# Patient Record
Sex: FEMALE | Race: WHITE | Employment: UNEMPLOYED | URBAN - METROPOLITAN AREA
[De-identification: names, ages, dates, MRNs, and addresses within clinical notes are randomized per-mention and may not be internally consistent; named-entity substitution may affect disease eponyms.]

---

## 2017-04-19 ENCOUNTER — HOSPITAL ENCOUNTER (OUTPATIENT)
Dept: PHYSICAL THERAPY | Age: 82
Discharge: HOME | End: 2017-04-19
Payer: MEDICARE

## 2017-04-19 PROCEDURE — 97163 PT EVAL HIGH COMPLEX 45 MIN: CPT

## 2017-04-19 PROCEDURE — G8978 MOBILITY CURRENT STATUS: HCPCS

## 2017-04-19 PROCEDURE — 97530 THERAPEUTIC ACTIVITIES: CPT

## 2017-04-19 PROCEDURE — G8979 MOBILITY GOAL STATUS: HCPCS

## 2017-04-19 NOTE — PROGRESS NOTES
[address]  [facility] PHYSICAL THERAPY  [address] [facility] [address], [address] - Phone: [phone]  Fax: [phone] / [address] [place] [address]  Patient Name: Ida Noel : 1933   Medical   Diagnosis: Low back pain [M54.5] Treatment Diagnosis: LBP w/T11 compression fx   Onset Date: chronic     Referral Source: Sandralee Sacks, NP Start of Care [facility]): 2017   Prior Hospitalization: See medical history Provider #: [id number]   Prior Level of Function: retired and sedentary; (I) with mobility and ADLs before 2017. Does not drive   Comorbidities: osteopenia, allergies, OA, compression fx, LBP, BMI>30, obstructive pulmonary diesease, CHF, A-Fib, previous hx of DVT and PE, DM, HTN,  Sleep dysfunction, visual and hearing impaired, defibrillator   Medications: Verified on Patient Summary List   The Plan of Care and following information is based on the information from the initial evaluation.   ===========================================================================================  Assessment / hernandez information:  Ida Noel is a 80 y.o.  female with Dx: Low back pain [M54.5], signs and symptoms consistent with chronic LBP and generalized weakness which is exacerbated by a T11 compression fracture verified by CT scan 2017. Patient also reports that she is unable to tolerate the supine position due to fear of inability to breathe. Patient now presents with gait abnormalities, poor posture, decreased LE strength, decreased transfer, ambulation, and ADL ability, and decreased ability to negotiate steps or obstacles.  Objective:    L/S ROM  Range    Effect    Strength (MMT)      Right  Left   Flex 75% NE Psoas (L2,3) 2 2   Ext 10% pain Quads (L3) 4+ 4-   R-lat flex     Ant tib(L4)       L-lat flex     Hip Add 4 4   R-rot     Glut Med(L5) 3+ 3+   L-rot     Hamstrings(S1,S2) 3 3     all measured in sitting   Hip IR/ER            FOTO score 19 points indicating 81% limitation in functional ability. Patient would benefit from skilled PT to address the below listed impairments. Thank you for your referral.  ===========================================================================================  Eval Complexity: History: HIGH Complexity :3+ comorbidities / personal factors will impact the outcome/ POC Exam:HIGH Complexity : 4+ Standardized tests and measures addressing body structure, function, activity limitation and / or participation in recreation  Presentation: HIGH Complexity : Unstable and unpredictable characteristics  Clinical Decision Making:HIGH Complexity : FOTO score of 1- 25 Overall Complexity:HIGH   Problem List: pain affecting function, decrease ROM, decrease strength, impaired gait/ balance, decrease ADL/ functional abilitiies, decrease activity tolerance, decrease flexibility/ joint mobility and decrease transfer abilities   Treatment Plan may include any combination of the following: Therapeutic exercise, Therapeutic activities, Neuromuscular re-education, Physical agent/modality, Gait/balance training, Manual therapy, Patient education, Self Care training, Functional mobility training, Home safety training and Stair training  Patient / Family readiness to learn indicated by: asking questions, trying to perform skills and interest  Persons(s) to be included in education: patient (P)  Barriers to Learning/Limitations: yes;  sensory deficits-vision/hearing/speech  Measures taken: na   Patient Goal (s): \"walk around house without walker, climb stairs, sleep in partial recline\"   Patient self reported health status: fair  Rehabilitation Potential: fair   Short Term Goals: To be accomplished in  2-3  weeks:  1. Patient will demonstrate compliance with HEP for symptom management at home.   2. Patient will demonstrate ability to tolerate Hilton's position for 10 minutes without any complications in order to perform mat therex. 3. Patient will demonstrate thoracolumbar ext of 50% in order to improve posture with gait and minimize LBP.  Long Term Goals: To be accomplished in  4-6  weeks:  1. Patient will be independent with HEP to self-manage and prevent symptoms upon DC. 2.  Patient will improve FOTO score to 38 points to indicate improved functional status. 3.  Patient will improve B hip abd strength to 4/5 in order to improve stability with ambulatory activities. 4.  Patient will improve hip flx strength to 3/5 in order to increase safety with transfers. Frequency / Duration:   Patient to be seen  2  times per week for 4-6  weeks:  Patient / Caregiver education and instruction: self care, activity modification and exercises  G-Codes (GP): Mobility [id number] Current  CM= 80-99%   Goal  CL= 60-79%. The severity rating is based on the FOTO Score    Therapist Signature: Alek Rizvi PT Date: 0/37/4283   Certification Period: 4/19/17 - 7/18/17 Time: 3:04 PM   ===========================================================================================  I certify that the above Physical Therapy Services are being furnished while the patient is under my care. I agree with the treatment plan and certify that this therapy is necessary. Physician Signature:        Date:       Time:     Please sign and return to [employer] or you may fax the signed copy to [phone]. Thank you.

## 2017-04-19 NOTE — PROGRESS NOTES
PHYSICAL THERAPY - DAILY TREATMENT NOTE    Patient Name: Meagan Greenwood        Date: 2017  : 1933   YES Patient  Verified  Visit #:   1     Insurance: Payor: Dilcia Castillo / Plan: [place] MED[place] PART [place] & B / Product Type: Medicare /      In time: 3:06 Out time: 4:55   Total Treatment Time: 52     Medicare Time Tracking (below)   Total Timed Codes (min):  49 1:1 Treatment Time:  49     TREATMENT AREA =  LBP  SUBJECTIVE    Pain Level (on 0 to 10 scale):  0  / 10   Medication Changes/New allergies or changes in medical history, any new surgeries or procedures? YES    If yes, update Summary List   Subjective Functional Status/Changes:  []  No changes reported     History of Condition: Pt is a 80 y.o. female who presents this day with a c/c of LBP and generalized weakness. Pt reports this began a long time ago and reports she had a compression fx of T11 vertebrae in -2017 verified by CT scan, but was not given a back brace. While pt was hospitalized, she was also treated for CHF. Pt admits she has osteopenia and pt denies hx of falls. Pt reports she has been unable to lay in supine for the past year due to a CHF exacerbation which made it difficult for her to breathe. Aggravating Factors: transfering, thoracolumbar ext    Alleviating Factors:  no activity, being still and careful with movement, meds    Previous Treatment:  denies previous PT    PMHx:see chart CHF, LE edema    Social/Recreational/Work: retired and sedentary; (I) with mobility and ADLs before 2017.  Does not drive    Pt Goals: \"walk around house without walker, climb stairs, sleep in partial recline\"     FOTO:19     LOW BACK EVALUATION    Objective:      Gait: ambulates w/ rollator, forward trunk, decreased leonel    Posture: kyphosis, forward head posture, enlarged R thoracic kyphosis    Palpation/Sensation: no TTP noted along paraspinals    (N - normal; R - reduced; MR - markedly reduced)       L/S ROM      Range Effect  Strength (MMT)          Right        Left    Flex 75% NE Psoas (L2,3) 2 2   Ext 10%  pain Quads (L3) 4+ 4-   R-lat flex   Ant tib(L4)     L-lat flex   Hip Add 4 4   R-rot   Glut Med(L5) 3+ 3+   L-rot   Hamstrings(S1,S2) 3 3    AROM measured in sitting  Hip IR/ER       23 min Therapeutic Activity: FOTO   Rationale:   Determine functional mobility     min Patient Education:  []  Review HEP   []  Progressed/Changed HEP based on:   HEP to be initiated NV     Other Objective/Functional Measures:    See above information    Patient refused therex this visit     Post Treatment Pain Level (on 0 to 10) scale:   0  / 10     ASSESSMENT    Assessment/Changes in Function:     Justification for Eval Code Complexity:  Patient History (low 0, mod 1-2, high 3-4): osteopenia, allergies, OA, compression fx, LBP, BMI>30, obstructive pulmonary diesease, CHF, A-Fib, previous hx of DVT and PE, DM, HTN,  Sleep dysfunction, visual and hearing impaired, defibrillator  Examination (low 1-2, mod 3+, high 4+):   Clinical Presentation (low stable or uncomplicated, mod evolving or changing, high unstable or unpredictable):   Clinical Decision Making (low , mod 26-74, high 1-25): FOTO 19    See PoC     []  See Progress Note/Recertification   Patient will continue to benefit from skilled PT services to modify and progress therapeutic interventions, address functional mobility deficits, address ROM deficits, address strength deficits, analyze and address soft tissue restrictions, analyze and cue movement patterns, analyze and modify body mechanics/ergonomics, assess and modify postural abnormalities, address imbalance/dizziness and instruct in home and community integration to attain remaining goals.    Progress toward goals / Updated goals:    Goals established, see PoC     PLAN    [x]  Upgrade activities as tolerated YES Continue plan of care   []  Discharge due to :    [x]  Other: Initiate PoC     Therapist: Alexx Newton, PT Date: 4/19/2017 Time: 3:05 PM

## 2017-04-24 ENCOUNTER — HOSPITAL ENCOUNTER (OUTPATIENT)
Dept: PHYSICAL THERAPY | Age: 82
Discharge: HOME | End: 2017-04-24
Payer: MEDICARE

## 2017-04-24 PROCEDURE — 97530 THERAPEUTIC ACTIVITIES: CPT

## 2017-04-24 PROCEDURE — 97110 THERAPEUTIC EXERCISES: CPT

## 2017-04-24 NOTE — PROGRESS NOTES
PHYSICAL THERAPY - DAILY TREATMENT NOTE    Patient Name: Robby Wynn        Date: 2017  : 1933   YES Patient  Verified  Visit #:   2     Insurance: Payor: Jelani Oneil / Plan: [place] MEDICARE PART A & B / Product Type: Medicare /      In time: 10:25 Out time: 11:03   Total Treatment Time: 38     Medicare Time Tracking (below)   Total Timed Codes (min):  38 1:1 Treatment Time:  38     TREATMENT AREA =  Low back pain [M54.5]    SUBJECTIVE    Pain Level (on 0 to 10 scale):  0  / 10   Medication Changes/New allergies or changes in medical history, any new surgeries or procedures? NO     If yes, update Summary List   Subjective Functional Status/Changes:  []  No changes reported     \"I'm not used to being awake this time of day. And I like to be behind the curtain if I can. I don't want everyone watching me. \"          OBJECTIVE    30 min Therapeutic Exercise:  [x]  See flow sheet   Rationale:      increase ROM and increase strength to improve the patients ability to perform ADL's with improved core stability. 8 min Therapeutic Activity: Sit<>supine transfers, gait training 2 x 30 ft   Rationale: To increase safety and efficiency with ADL's.     min Patient Education:  YES  Reviewed HEP   []  Progressed/Changed HEP based on:   Patient reports compliance     Other Objective/Functional Measures:    Pt did tolerate lying on a wedge, was fearful of lying supine. Pt was able to performed bridges in a shortened range, initiated other LE strengthening exercises. See flowsheet for more details. Post Treatment Pain Level (on 0 to 10) scale:   1  / 10     ASSESSMENT    Assessment/Changes in Function:     Pt has a poor activity tolerance and is very fearful of hurting herself. Pt requires a lot of encouragement to ensure she will not injure her back when performing seated and supine hip stabilization exercises.      []  See Progress Note/Recertification   Patient will continue to benefit from skilled PT services to modify and progress therapeutic interventions, address functional mobility deficits, address strength deficits, analyze and address soft tissue restrictions, analyze and cue movement patterns, analyze and modify body mechanics/ergonomics and assess and modify postural abnormalities to attain remaining goals.    Progress toward goals / Updated goals:    First follow-up since eval.     PLAN    [x]  Upgrade activities as tolerated YES Continue plan of care   []  Discharge due to :    []  Other:      Therapist: Sanya Puga    Date: 4/24/2017 Time: 10:31 AM     Future Appointments  Date Time Provider Kavita Real   4/28/2017 9:30 AM Daniela DILL [facility]   5/3/2017 1:00 PM Umesh Arauz [facility]   5/5/2017 10:00 AM Orly Valdez [facility]   5/8/2017 1:00 PM Orly Valdez [facility]   5/12/2017 1:30 PM Juan José [facility]   5/17/2017 1:00 PM Umesh Arauz PT [facility]   5/19/2017 10:00 AM Orly Valdez [facility]   [facility]   5/26/2017 10:00 AM Orly Valdez [facility]   5/31/2017 1:00 PM Umesh Arauz PT [facility]

## 2017-04-28 ENCOUNTER — HOSPITAL ENCOUNTER (OUTPATIENT)
Dept: PHYSICAL THERAPY | Age: 82
Discharge: HOME | End: 2017-04-28
Payer: MEDICARE

## 2017-04-28 PROCEDURE — 97110 THERAPEUTIC EXERCISES: CPT

## 2017-04-28 NOTE — PROGRESS NOTES
PHYSICAL THERAPY - DAILY TREATMENT NOTE      Patient Name: Lena Alexander        Date: 2017  : 1933   YES Patient  Verified  Visit #:   3   of     Insurance: Payor: Gumaro Anastasia / Plan: [place] MEDICARE PART A & B / Product Type: Medicare /      In time: 9:30 Out time: 10:00   Total Treatment Time: 30 min     Medicare Time Tracking (below)   Total Timed Codes (min):  30 1:1 Treatment Time:  30     TREATMENT AREA =  Low back pain [M54.5]    SUBJECTIVE    Pain Level (on 0 to 10 scale):  0  / 10   Medication Changes/New allergies or changes in medical history, any new surgeries or procedures? NO    If yes, update Summary List   Subjective Functional Status/Changes:  []  No changes reported     \"I need an easy day. \"        OBJECTIVE    25 (25) min Therapeutic Exercise:  [x]  See flow sheet   Rationale:      increase ROM, increase strength, improve coordination, improve balance and increase proprioception to improve the patients ability to ambulate with improved LE stability     5 (5) min Therapeutic Activity: (S) with stair negoatiation   Rationale:   increase strength, improve coordination, improve balance and increase proprioception to improve the patients home entry/exit with improved safety. 1 min Patient Education:  YES  Reviewed HEP   []  Progressed/Changed HEP based on: Other Objective/Functional Measures:    Pt with c/o LBP with standing hip abduction; pain abolished immediately upon seated rest break  Added alternating tap ups on 4\" box inside parallel bars with bilateral HHA    Negotiated 3 steps x 2 trials with (S) and use of bilateral handrails     Post Treatment Pain Level (on 0 to 10) scale:   0  / 10     ASSESSMENT    Assessment/Changes in Function:     Fair tolerance for therex today with alternating between standing/seated exercises. Demo safe step to stair negotiation with bilateral HHA.  Will continue to progress to unilateral HHA as this is what pt has to reach bedroom. []  See Progress Note/Recertification   Patient will continue to benefit from skilled PT services to modify and progress therapeutic interventions, address functional mobility deficits, address ROM deficits, address strength deficits, analyze and address soft tissue restrictions, analyze and cue movement patterns, analyze and modify body mechanics/ergonomics, assess and modify postural abnormalities, address imbalance/dizziness and instruct in home and community integration to attain remaining goals. Progress toward goals / Updated goals: · Short Term Goals: To be accomplished in 2-3 weeks:  1. Patient will demonstrate compliance with HEP for symptom management at home. 2. Patient will demonstrate ability to tolerate Hilton's position for 10 minutes without any complications in order to perform mat therex. 3. Patient will demonstrate thoracolumbar ext of 50% in order to improve posture with gait and minimize LBP.        PLAN    [x]  Upgrade activities as tolerated YES Continue plan of care   []  Discharge due to :    []  Other:      Therapist: Vj Velasco DPT    Date: 4/28/2017 Time: 8:25 AM     Future Appointments  Date Time Provider Kavita Real   4/28/2017 9:30 AM Brittany DILL [facility]   5/3/2017 1:00 PM Steve Moran [facility]   5/5/2017 10:00 AM Rebeka Garcia [facility]   5/8/2017 1:00 PM Rebeka Garcia [facility]   5/12/2017 1:30 PM Raman DILL [facility]   5/17/2017 1:00 PM Steve Moran [facility]   5/19/2017 10:00 AM Rebeka Garcia [facility]   5/24/2017 1:00 PM Nataly Barrera [facility]   5/26/2017 10:00 AM Rebeka Garcia [facility]   5/31/2017 1:00 PM Steve Moran [facility]

## 2017-05-03 ENCOUNTER — HOSPITAL ENCOUNTER (OUTPATIENT)
Dept: PHYSICAL THERAPY | Age: 82
Discharge: HOME | End: 2017-05-03
Payer: MEDICARE

## 2017-05-03 PROCEDURE — 97110 THERAPEUTIC EXERCISES: CPT

## 2017-05-03 NOTE — PROGRESS NOTES
PHYSICAL THERAPY - DAILY TREATMENT NOTE    Patient Name: Hong Mast        Date: 5/3/2017  : 1933   YES Patient  Verified  Visit #:   4     Insurance: Payor: Wen Sachin / Plan: [place] MEDICARE PART A & B / Product Type: Medicare /      In time: 1:00 Out time: 1:30   Total Treatment Time: 30     Medicare Time Tracking (below)   Total Timed Codes (min):  30 1:1 Treatment Time:  30     TREATMENT AREA =  Low back pain [M54.5]    SUBJECTIVE    Pain Level (on 0 to 10 scale):  1-2  / 10   Medication Changes/New allergies or changes in medical history, any new surgeries or procedures? NO     If yes, update Summary List   Subjective Functional Status/Changes:  []  No changes reported     \"I think I overdid it some yesterday, I am having a little pain and I'm not used to any. I really liked going up the stairs last time. I want to only be here for 30 minutes. \"          OBJECTIVE    30 min Therapeutic Exercise:  [x]  See flow sheet   Rationale:      increase ROM, increase strength, improve coordination, improve balance and increase proprioception to improve the patients ability to perform ADL's and amb with decreased pain and improved ease      min Patient Education:  YES  Reviewed HEP   []  Progressed/Changed HEP based on:   Patient reports compliance     Other Objective/Functional Measures:    Patient required numerous rest breaks this session, reports fatigue  Able to progress reps of stairs, continues to require supervision and B HR'     Post Treatment Pain Level (on 0 to 10) scale:   1  / 10     ASSESSMENT    Assessment/Changes in Function:     Patient with limited progress this session due to fatigue     []  See Progress Note/Recertification   Patient will continue to benefit from skilled PT services to modify and progress therapeutic interventions, address functional mobility deficits, address ROM deficits, address strength deficits, analyze and address soft tissue restrictions, analyze and cue movement patterns, analyze and modify body mechanics/ergonomics and assess and modify postural abnormalities to attain remaining goals. Progress toward goals / Updated goals: · Short Term Goals: To be accomplished in 2-3 weeks:  1. Patient will demonstrate compliance with HEP for symptom management at home. 2. Patient will demonstrate ability to tolerate Hilton's position for 10 minutes without any complications in order to perform mat therex. 3. Patient will demonstrate thoracolumbar ext of 50% in order to improve posture with gait and minimize LBP. · Long Term Goals: To be accomplished in 4-6 weeks:  1. Patient will be independent with HEP to self-manage and prevent symptoms upon DC. 2. Patient will improve FOTO score to 38 points to indicate improved functional status. 3. Patient will improve B hip abd strength to 4/5 in order to improve stability with ambulatory activities. 4. Patient will improve hip flx strength to 3/5 in order to increase safety with transfers.      PLAN    [x]  Upgrade activities as tolerated YES Continue plan of care   []  Discharge due to :    []  Other:      Therapist: Alek Rizvi PT    Date: 5/3/2017 Time: 1:00 PM     Future Appointments  Date Time Provider Kavita Real   5/5/2017 10:00 AM Chyna Noriega [facility]   5/8/2017 1:00 PM Chyna Noriega [facility]   [facility]   5/17/2017 1:00 PM Alek Rizvi PT [facility]   5/19/2017 10:00 AM Chyna Noriega [facility]   5/24/2017 1:00 PM Andie [facility]   5/26/2017 10:00 AM Chyna Noriega [facility]   5/31/2017 1:00 PM Alek Rizvi PT [facility]

## 2017-05-05 ENCOUNTER — HOSPITAL ENCOUNTER (OUTPATIENT)
Dept: PHYSICAL THERAPY | Age: 82
Discharge: HOME | End: 2017-05-05
Payer: MEDICARE

## 2017-05-05 PROCEDURE — 97110 THERAPEUTIC EXERCISES: CPT

## 2017-05-05 PROCEDURE — 97530 THERAPEUTIC ACTIVITIES: CPT

## 2017-05-05 NOTE — PROGRESS NOTES
PHYSICAL THERAPY - DAILY TREATMENT NOTE    Patient Name: Hossein Huffman        Date: 2017  : 1933   yes Patient  Verified  Visit #:     Insurance: Payor: Prabhumyravania Lundberg / Plan: [place] MEDICARE PART A & B / Product Type: Medicare /      In time: 1000 Out time: 3496   Total Treatment Time: 35     Medicare Time Tracking (below)   Total Timed Codes (min):  35 1:1 Treatment Time:  35     TREATMENT AREA =  Low back pain [M54.5]    SUBJECTIVE  Pain Level (on 0 to 10 scale):  1  / 10   Medication Changes/New allergies or changes in medical history, any new surgeries or procedures?    no  If yes, update Summary List   Subjective Functional Status/Changes:  []  No changes reported     \"I am           OBJECTIVE      25 min Therapeutic Exercise:  [x]  See flow sheet   Rationale:      increase ROM and increase strength to improve the patients ability to   perform ADLs/prolong stding and amb/stairs with ease     10 min Therapeutic Activity: step ups  negotiated up/down 3 steps x 4 with B HRs step to pattern> reciprocal pattern   tap ups   Rationale:    increase ROM and increase strength to improve the patients ability to  perform ADLs/prolong stding and amb/stairs with ease          min Patient Education:  yes  Reviewed HEP   []  Progressed/Changed HEP based on:  Pt ed on importance and benefits of compliance with HEP, core strength/stability and proper posture; pt verbalized understanding    See updated HEP in chart       Other Objective/Functional Measures:    Initiated TE step ups on 4\", and std hip abd  VCs + demo to perform proper technique for TE  c/o pain with most TE, able to perform all TE without increasing sxs   demos poor posture with gait, improved with VCs to decrease trunk flex     Post Treatment Pain Level (on 0 to 10) scale:   1  / 10     ASSESSMENT  Assessment/Changes in Function:     Progressed there-ex without c/o increase p!  improved posture with gait post rx     []  See Progress Note/Recertification   Patient will continue to benefit from skilled PT services to modify and progress therapeutic interventions, address functional mobility deficits, address ROM deficits, address strength deficits, analyze and address soft tissue restrictions, analyze and cue movement patterns, analyze and modify body mechanics/ergonomics, assess and modify postural abnormalities and instruct in home and community integration to attain remaining goals. Progress toward goals / Updated goals: · Short Term Goals: To be accomplished in 2-3 weeks:  1. Patient will demonstrate compliance with HEP for symptom management at home. MET  2. Patient will demonstrate ability to tolerate Hilton's position for 10 minutes without any complications in order to perform mat therex. 3. Patient will demonstrate thoracolumbar ext of 50% in order to improve posture with gait and minimize LBP. progressing, demos upright posture with VCs  · Long Term Goals: To be accomplished in 4-6 weeks:  1. Patient will be independent with HEP to self-manage and prevent symptoms upon DC. 2. Patient will improve FOTO score to 38 points to indicate improved functional status. 3. Patient will improve B hip abd strength to 4/5 in order to improve stability with ambulatory activities. 4. Patient will improve hip flx strength to 3/5 in order to increase safety with transfers.      PLAN  []  Upgrade activities as tolerated yes Continue plan of care   []  Discharge due to :    []  Other:      Therapist: Rebeka Garcia PTA    Date: 5/5/2017 Time: 10:35 AM     Future Appointments  Date Time Provider Kavita Real   5/8/2017 1:00 PM Rebeka Garcia PTA [facility]   5/12/2017 1:30 PM Nataly Caba [facility]   5/17/2017 1:00 PM Steve Moran PT [facility]   5/19/2017 10:00 AM Rebeka Garcia [facility]   5/24/2017 1:00 PM [address] [facility]   5/26/2017 10:00 AM Rebeka Garcia PTA [facility]   5/31/2017 1:00 PM Steve Moran PT [facility]

## 2017-05-08 ENCOUNTER — HOSPITAL ENCOUNTER (OUTPATIENT)
Dept: PHYSICAL THERAPY | Age: 82
Discharge: HOME | End: 2017-05-08
Payer: MEDICARE

## 2017-05-08 PROCEDURE — 97110 THERAPEUTIC EXERCISES: CPT

## 2017-05-08 PROCEDURE — 97530 THERAPEUTIC ACTIVITIES: CPT

## 2017-05-08 NOTE — PROGRESS NOTES
PHYSICAL THERAPY - DAILY TREATMENT NOTE    Patient Name: Brenna Parker        Date: 2017  : 1933   yes Patient  Verified  Visit #:     Insurance: Payor: Devika Auguste / Plan: [place] MEDICARE PART A & B / Product Type: Medicare /      In time: 100 Out time: 135   Total Treatment Time: 35     Medicare Time Tracking (below)   Total Timed Codes (min):  35 1:1 Treatment Time:  35     TREATMENT AREA =  Low back pain [M54.5]    SUBJECTIVE  Pain Level (on 0 to 10 scale): 3  / 10   Medication Changes/New allergies or changes in medical history, any new surgeries or procedures?    no  If yes, update Summary List   Subjective Functional Status/Changes:  []  No changes reported     \"I am trying to work on my posture when I walk\"         OBJECTIVE      25 min Therapeutic Exercise:  [x]  See flow sheet   Rationale:      increase ROM and increase strength to improve the patients ability to   perform ADLs/prolong stding and amb/stairs with ease     10 min Therapeutic Activity: step ups  negotiated up/down 3 steps x 4 with B HRs with reciprocal pattern   tap ups   Rationale:    increase ROM and increase strength to improve the patients ability to  perform ADLs/prolong stding and amb/stairs with ease          min Patient Education:  yes  Reviewed HEP   []  Progressed/Changed HEP based on:  Pt ed on importance and benefits of compliance with HEP, core strength/stability and proper posture; pt verbalized understanding     Other Objective/Functional Measures:  demos improved trunk ext with gait upon entering clinic    Increased to 1.5 # with LAQs without c/o     VCs + demo to perform proper technique for TE       Post Treatment Pain Level (on 0 to 10) scale:   1  / 10     ASSESSMENT  Assessment/Changes in Function:     Progressed there-ex without c/o increase p!  demos improved trunk ext with gait upon entering clinic     []  See Progress Note/Recertification   Patient will continue to benefit from skilled PT services to modify and progress therapeutic interventions, address functional mobility deficits, address ROM deficits, address strength deficits, analyze and address soft tissue restrictions, analyze and cue movement patterns, analyze and modify body mechanics/ergonomics, assess and modify postural abnormalities and instruct in home and community integration to attain remaining goals. Progress toward goals / Updated goals: · Short Term Goals: To be accomplished in 2-3 weeks:  1. Patient will demonstrate compliance with HEP for symptom management at home. MET  2. Patient will demonstrate ability to tolerate Hilton's position for 10 minutes without any complications in order to perform mat therex. 3. Patient will demonstrate thoracolumbar ext of 50% in order to improve posture with gait and minimize LBP. progressing, demos upright posture with VCs  · Long Term Goals: To be accomplished in 4-6 weeks:  1. Patient will be independent with HEP to self-manage and prevent symptoms upon DC. 2. Patient will improve FOTO score to 38 points to indicate improved functional status. 3. Patient will improve B hip abd strength to 4/5 in order to improve stability with ambulatory activities. 4. Patient will improve hip flx strength to 3/5 in order to increase safety with transfers.      PLAN  []  Upgrade activities as tolerated yes Continue plan of care   []  Discharge due to :    []  Other:      Therapist: Geri Zaragoza PTA    Date: 5/8/2017 Time: 10:35 AM     Future Appointments  Date Time Provider Kavita Real   5/12/2017 1:30 PM Kezia [facility]   5/17/2017 1:00 PM Maday Garcia PT [facility]   5/19/2017 10:00 AM Geri Zaragoza PTA [facility]   5/24/2017 1:00 PM [address] [facility]   5/26/2017 10:00 AM Geri Zaragoza PTA [facility]   5/31/2017 1:00 PM Maday Garcia PT [facility]

## 2017-05-12 ENCOUNTER — HOSPITAL ENCOUNTER (OUTPATIENT)
Dept: PHYSICAL THERAPY | Age: 82
Discharge: HOME | End: 2017-05-12
Payer: MEDICARE

## 2017-05-12 PROCEDURE — 97110 THERAPEUTIC EXERCISES: CPT

## 2017-05-12 NOTE — PROGRESS NOTES
PHYSICAL THERAPY - DAILY TREATMENT NOTE      Patient Name: Tricia Woodruff        Date: 2017  : 1933   YES Patient  Verified  Visit #:   7   of     Insurance: Payor: Flor Sy / Plan: [place] MEDICARE PART A & B / Product Type: Medicare /      In time: 1:33 Out time: 2:00   Total Treatment Time: 27 min     Medicare Time Tracking (below)   Total Timed Codes (min):  27 1:1 Treatment Time:  27     TREATMENT AREA =  Low back pain [M54.5]    SUBJECTIVE    Pain Level (on 0 to 10 scale):  0  / 10   Medication Changes/New allergies or changes in medical history, any new surgeries or procedures? NO    If yes, update Summary List   Subjective Functional Status/Changes:  []  No changes reported     \"I went to the pulmonologist today; everything's OK. \"        OBJECTIVE    27 (27) min Therapeutic Exercise:  [x]  See flow sheet   Rationale:      increase ROM, increase strength, improve coordination, improve balance and increase proprioception to improve the patients ability to improved standing/ambulation tolerance. 1 min Patient Education:  YES  Reviewed HEP   []  Progressed/Changed HEP based on: Added scap retractions and T/S extension in chair. Other Objective/Functional Measures:    Inc forward step ups/tap ups to 6\" box today with RUE support at staircase; patient required 1 standing rest break during each exercise    HR 72, SpO2 96 following tap up/step ups    POC performed seated in rollator with focus on thoracic and lumbar repeated extension. Inc resistance of seated therex today without pain   Post Treatment Pain Level (on 0 to 10) scale:   0  / 10     ASSESSMENT    Assessment/Changes in Function:     Patient with decreasing forward flexed posture during gait. Continues to demo muscular and cardiovascular fatigue during therex however SpO2 and HR in normal range. Will continue to emphasize regular spinal extension as part of HEP.      []  See Progress Note/Recertification   Patient will continue to benefit from skilled PT services to modify and progress therapeutic interventions, address functional mobility deficits, address ROM deficits, address strength deficits, analyze and address soft tissue restrictions, analyze and cue movement patterns, analyze and modify body mechanics/ergonomics, assess and modify postural abnormalities, address imbalance/dizziness and instruct in home and community integration to attain remaining goals. Progress toward goals / Updated goals: · Short Term Goals: To be accomplished in 2-3 weeks:  1. Patient will demonstrate compliance with HEP for symptom management at home. 2. Patient will demonstrate ability to tolerate Hilton's position for 10 minutes without any complications in order to perform mat therex. 3. Patient will demonstrate thoracolumbar ext of 50% in order to improve posture with gait and minimize LBP.  POC w/ TA, T/S ext in chair       PLAN    [x]  Upgrade activities as tolerated YES Continue plan of care   []  Discharge due to :    []  Other:      Therapist: Dana Ortiz DPT    Date: 5/12/2017 Time: 11:09 AM     Future Appointments  Date Time Provider Kavita Real   5/12/2017 1:30 PM Carson DILL [facility]   5/17/2017 1:00 PM Sergei Batista PT [facility]   5/19/2017 10:00 AM Dwaine Galindo [facility]   5/24/2017 1:00 PM Orly Pinedo [facility]   5/26/2017 10:00 AM Dwaien Galindo [facility]   5/31/2017 1:00 PM Sergei Batista PT [facility]

## 2017-05-17 ENCOUNTER — HOSPITAL ENCOUNTER (OUTPATIENT)
Dept: PHYSICAL THERAPY | Age: 82
Discharge: HOME | End: 2017-05-17
Payer: MEDICARE

## 2017-05-17 PROCEDURE — 97110 THERAPEUTIC EXERCISES: CPT

## 2017-05-17 NOTE — PROGRESS NOTES
PHYSICAL THERAPY - DAILY TREATMENT NOTE    Patient Name: Geena Butterfield        Date: 2017  : 1933   YES Patient  Verified  Visit #:     Insurance: Payor: Hi Garner / Plan: [place] MEDICARE PART A & B / Product Type: Medicare /      In time: 1:00 Out time: 1:27   Total Treatment Time: 27     Medicare Time Tracking (below)   Total Timed Codes (min):  27 1:1 Treatment Time:  27     TREATMENT AREA =  Low back pain [M54.5]    SUBJECTIVE    Pain Level (on 0 to 10 scale):  0  / 10   Medication Changes/New allergies or changes in medical history, any new surgeries or procedures? NO     If yes, update Summary List   Subjective Functional Status/Changes:  []  No changes reported     \"I don't have any pain. My legs stay sore, it isn't going away. The weakness isn't getting better. \"          OBJECTIVE    27 min Therapeutic Exercise:  [x]  See flow sheet   Rationale:      increase ROM, increase strength, improve coordination, improve balance and increase proprioception to improve the patients ability to perform ADLs' and amb with decreased pain and improved ease      min Patient Education:  YES  Reviewed HEP   []  Progressed/Changed HEP based on:   Patient reports compliance     Other Objective/Functional Measures:     Mod cues for recollection of ex's this visit, despite reporting HEP compliance  Advised patient to rest to alleviate LE soreness, however patient refused  Numerous rest breaks required this visit, alternated between seated and standing therex  Patient did not perform full requested reps this visit  Extremely fatigued after 3x3 steps stair negotiation     Post Treatment Pain Level (on 0 to 10) scale:   0  / 10     ASSESSMENT    Assessment/Changes in Function:     Patient continues with extreme fatigue after sessions limiting progress     []  See Progress Note/Recertification   Patient will continue to benefit from skilled PT services to modify and progress therapeutic interventions, address functional mobility deficits, address ROM deficits, address strength deficits, analyze and address soft tissue restrictions, analyze and cue movement patterns, analyze and modify body mechanics/ergonomics and assess and modify postural abnormalities to attain remaining goals. Progress toward goals / Updated goals: · Short Term Goals: To be accomplished in 2-3 weeks:  1. Patient will demonstrate compliance with HEP for symptom management at home. MET  2. Patient will demonstrate ability to tolerate Hilton's position for 10 minutes without any complications in order to perform mat therex. 3. Patient will demonstrate thoracolumbar ext of 50% in order to improve posture with gait and minimize LBP. progressing, demos upright posture with VCs; POC with TA, T/S ext in chair  · Long Term Goals: To be accomplished in 4-6 weeks:  1. Patient will be independent with HEP to self-manage and prevent symptoms upon DC. 2. Patient will improve FOTO score to 38 points to indicate improved functional status. 3. Patient will improve B hip abd strength to 4/5 in order to improve stability with ambulatory activities. 4. Patient will improve hip flx strength to 3/5 in order to increase safety with transfers.     PN NV     PLAN    [x]  Upgrade activities as tolerated YES Continue plan of care   []  Discharge due to :    []  Other:      Therapist: Tai Bowen PT    Date: 5/17/2017 Time: 12:59 PM     Future Appointments  Date Time Provider aKvita Real   5/17/2017 1:00 PM Tai Bowen PT [facility]   5/19/2017 10:00 AM Hari Jimenez [facility]   5/24/2017 1:00 PM [address] [place] [address]   5/26/2017 10:00 AM Hari iJmenez PTA [facility]   5/31/2017 1:00 PM Tai Bowen PT [facility]

## 2017-05-19 ENCOUNTER — HOSPITAL ENCOUNTER (OUTPATIENT)
Dept: PHYSICAL THERAPY | Age: 82
Discharge: HOME | End: 2017-05-19
Payer: MEDICARE

## 2017-05-19 PROCEDURE — 97110 THERAPEUTIC EXERCISES: CPT

## 2017-05-19 PROCEDURE — G8979 MOBILITY GOAL STATUS: HCPCS

## 2017-05-19 PROCEDURE — 97530 THERAPEUTIC ACTIVITIES: CPT

## 2017-05-19 PROCEDURE — G8980 MOBILITY D/C STATUS: HCPCS

## 2017-05-19 NOTE — PROGRESS NOTES
PHYSICAL THERAPY - DAILY TREATMENT NOTE    Patient Name: Adolph Lopez        Date: 2017  : 1933   yes Patient  Verified  Visit #:     Insurance: Payor: Jannetta Dancer / Plan: [place] MEDICARE PART A & B / Product Type: Medicare /      In time: 1000 Out time:    Total Treatment Time: 35     Medicare Time Tracking (below)   Total Timed Codes (min): 35 1:1 Treatment Time:  35     TREATMENT AREA =  Low back pain [M54.5]    SUBJECTIVE  Pain Level (on 0 to 10 scale): 1 / 10   Medication Changes/New allergies or changes in medical history, any new surgeries or procedures?    no  If yes, update Summary List   Subjective Functional Status/Changes:  []  No changes reported     \"I still have the pain.  I am moving around better overall'       OBJECTIVE      25 min Therapeutic Exercise:  [x]  See flow sheet   Rationale:      increase ROM and increase strength to improve the patients ability to   perform ADLs/prolong stding and amb/stairs with ease     10 min Therapeutic Activity: step ups  negotiated up/down 3 steps x 5 with B HRs with reciprocal pattern   tap ups  Foto assessment   Rationale:    increase ROM and increase strength to improve the patients ability to  perform ADLs/prolong stding and amb/stairs with ease          min Patient Education:  yes  Reviewed HEP   []  Progressed/Changed HEP based on:  Pt ed on importance and benefits of compliance with HEP, core strength/stability and proper posture; pt verbalized understanding     Other Objective/Functional Measures:    functional status summery=55 (IE=19/goal=38)    hip strength flex=L=4/5, R=4/5; ABD:L=5/5, R=5/5*performed seated   VCs + demo to perform proper technique for TE  demos fwd trunk flex with stair negotiation; improved with VCs     Post Treatment Pain Level (on 0 to 10) scale:   0  / 10     ASSESSMENT  Assessment/Changes in Function:     Progressed there-ex without c/o increase p!  demos improved trunk ext with gait upon entering clinic     []  See Progress Note/Recertification   Patient will continue to benefit from skilled PT services to modify and progress therapeutic interventions, address functional mobility deficits, address ROM deficits, address strength deficits, analyze and address soft tissue restrictions, analyze and cue movement patterns, analyze and modify body mechanics/ergonomics, assess and modify postural abnormalities and instruct in home and community integration to attain remaining goals. Progress toward goals / Updated goals: Mobility   Goal  CK= 40-59%  D/C  CK= 40-59%. The severity rating is based on the FOTO Score    · Long Term Goals: To be accomplished in 4-6 weeks:  1. Patient will be independent with HEP to self-manage and prevent symptoms upon DC. 2. Patient will improve FOTO score to 38 points to indicate improved functional status. 3. Patient will improve B hip abd strength to 4/5 in order to improve stability with ambulatory activities. MET=5/5 *performed seated  4. Patient will improve hip flx strength to 3/5 in order to increase safety with transfers.  MET=4/5     PLAN  []  Upgrade activities as tolerated yes Continue plan of care   []  Discharge due to :    []  Other:      Therapist: Chyna Noriega PTA    Date: 5/19/2017 Time: 10:03 AM     Future Appointments  Date Time Provider Kavita Real   [facility]   5/26/2017 10:00 AM Chyna Noriega PTA [facility]   5/31/2017 1:00 PM Alek Rizvi PT [facility]

## 2017-05-19 NOTE — PROGRESS NOTES
[address] [facility] PHYSICAL THERAPY  [address], [address] - Phone: [phone]  Fax: [phone]          Patient Name: Yashira Bruce : 1933   Treatment/Medical Diagnosis: Low back pain [M54.5]   Onset Date: chronic    Referral Source: Rose Mary Quiroz NP Start of Care [facility]): 2017   Prior Hospitalization: See Medical History Provider #: [id number]   Prior Level of Function: retired and sedentary; (I) with mobility and ADLs before 2017. Does not drive   Comorbidities: osteopenia, allergies, OA, compression fx, LBP, BMI>30, obstructive pulmonary diesease, CHF, A-Fib, previous hx of DVT and PE, DM, HTN, Sleep dysfunction, visual and hearing impaired, defibrillator   Medications: Verified on Patient Summary List   Visits from [facility]: 9 Missed Visits: 0     Goal/Measure of Progress Goal Met? 1. Patient will demonstrate ability to tolerate Hilton's position for 10 minutes without any complications in order to perform mat therex. Status at last Eval: Unable to tolerate Current Status: NT no   2. Patient will demonstrate thoracolumbar ext of 50% in order to improve posture with gait and minimize LBP. Status at last Eval: 10% Current Status: Requires cueing for upright posture no   3. Patient will improve FOTO score to 38 points to indicate improved functional status. Status at last Eval: 19 Current Status: 55 yes   4. Patient will improve B hip abd strength to 4/5 in order to improve stability with ambulatory activities. Status at last Eval: 3+/5 B in sitting Current Status: 5/5 in sitting yes     5. Patient will improve hip flex strength to 3/5 in order to increase safety with transfers.    Status at last Eval: 2/5 B Current Status: 4/5 in sitting yes     Therapy has consisted of dynamic warmup, followed by therex to improve LE strength, core stability, and activity tolerance, and stair negotiation to improve safety with home and community mobility. Key Functional Changes/Progress: Patient has progressed fairly well with therapy, demonstrating improved LE strength in sitting hip strength flex=L=4/5, R=4/5; ABD:L=5/5, R=5/5. Patient continues with poor, forward flexed posture, and requires ongoing cueing for proper form with therex. Patient continues to be challenged with stair negotiation as well. FOTO score improved to 55 indicating 45% limitation in functional ability. Problem List: pain affecting function, decrease ROM, decrease strength, edema affecting function, impaired gait/ balance, decrease ADL/ functional abilitiies, decrease activity tolerance, decrease flexibility/ joint mobility and decrease transfer abilities   Treatment Plan may include any combination of the following: Therapeutic exercise, Therapeutic activities, Neuromuscular re-education, Physical agent/modality, Gait/balance training, Manual therapy, Aquatic therapy, Patient education, Self Care training, Functional mobility training, Home safety training and Stair training  Patient Goal(s) has been updated and includes:      Goals for this certification period include and are to be achieved in   4  weeks:  1. Patient will demonstrate ability to tolerate Hilton's position for 10 minutes without any complications in order to perform mat therex. 2.   Patient will demonstrate thoracolumbar ext of 50% in order to improve posture with gait and minimize LBP. 3.   Patient will perform stair negotiation 5x3 steps without rest break in order to negotiate to second floor. Frequency / Duration:   Patient to be seen   2   times per week for   2-4    weeks:  G-Codes (GP): Mobility  [id number] Goal  CK= 40-59%  D/C  CK= 40-59%.   The severity rating is based on the FOTO Score    Assessments/Recommendations: Patient would benefit from ongoing PT to further improve strength, activity tolerance, and safety with home and community mobility  If you have any questions/comments please contact us directly at [phone]. Thank you for allowing us to assist in the care of your patient. Therapist Signature: Shakira Aleman PT Date: 5/39/5145   Certification Period:  Reporting Period: 4/19/17-7/18/17 4/19/17-5/19/17 Time: 2:49 PM   NOTE TO PHYSICIAN:  PLEASE COMPLETE THE ORDERS BELOW AND FAX TO   [facility]: [phone]. If you are unable to process this request in 24 hours please contact our office: [phone].    ___ I have read the above report and request that my patient continue as recommended.   ___ I have read the above report and request that my patient continue therapy with the following changes/special instructions: ________________________________________________   ___ I have read the above report and request that my patient be discharged from therapy.      Physician Signature:        Date:       Time:

## 2017-05-24 ENCOUNTER — HOSPITAL ENCOUNTER (OUTPATIENT)
Dept: PHYSICAL THERAPY | Age: 82
Discharge: HOME | End: 2017-05-24
Payer: MEDICARE

## 2017-05-24 PROCEDURE — G8979 MOBILITY GOAL STATUS: HCPCS

## 2017-05-24 PROCEDURE — 97530 THERAPEUTIC ACTIVITIES: CPT

## 2017-05-24 PROCEDURE — G8978 MOBILITY CURRENT STATUS: HCPCS

## 2017-05-24 PROCEDURE — 97110 THERAPEUTIC EXERCISES: CPT

## 2017-05-24 NOTE — PROGRESS NOTES
PHYSICAL THERAPY - DAILY TREATMENT NOTE      Patient Name: Daquan Marvin        Date: 2017  : 1933   YES Patient  Verified  Visit #:   10   of   12-18  Insurance: Payor: Jesus Peacock / Plan: [place] MEDICARE PART A & B / Product Type: Medicare /      In time: 1:00 Out time: 1:40   Total Treatment Time: 40 min     Medicare Time Tracking (below)   Total Timed Codes (min):  40 1:1 Treatment Time:  40     TREATMENT AREA =  Low back pain [M54.5]    SUBJECTIVE    Pain Level (on 0 to 10 scale):  0  / 10   Medication Changes/New allergies or changes in medical history, any new surgeries or procedures? NO    If yes, update Summary List   Subjective Functional Status/Changes:  []  No changes reported     \"I'm not hurting as much. \"        OBJECTIVE    28 (28) min Therapeutic Exercise:  [x]  See flow sheet   Rationale:      increase ROM, increase strength, improve coordination, improve balance and increase proprioception to improve the patients ability to perform functional transfers with incresaed ease and efficiency     10 (10) min Therapeutic Activity: Stair negotiation 3 steps x 4 (twice) B HHA   Rationale:   increase ROM, increase strength, improve coordination, improve balance and increase proprioception to improve the patients ability to negotiate      1 min Patient Education:  YES  Reviewed HEP   []  Progressed/Changed HEP based on: Other Objective/Functional Measures:    Stair negotiation  3 steps x 4 trials. Pt performed twice (S) with Bilat HHA  Pt ambulated into clinic today with improved erect posture and T/S extension     Post Treatment Pain Level (on 0 to 10) scale:   0  / 10     ASSESSMENT    Assessment/Changes in Function:     Patient will continue to benefit from stair training, bed mobility (supine <> sit), transfer safety and postural re-education.      []  See Progress Note/Recertification   Patient will continue to benefit from skilled PT services to modify and progress therapeutic interventions, address functional mobility deficits, address ROM deficits, address strength deficits, analyze and address soft tissue restrictions, analyze and cue movement patterns, analyze and modify body mechanics/ergonomics, assess and modify postural abnormalities, address imbalance/dizziness and instruct in home and community integration to attain remaining goals. Progress toward goals / Updated goals:    1. Patient will demonstrate ability to tolerate Hilton's position for 10 minutes without any complications in order to perform mat therex. 2. Patient will demonstrate thoracolumbar ext of 50% in order to improve posture with gait and minimize LBP. T/S ext with scap retraction today  3. Patient will perform stair negotiation 5x3 steps without rest break in order to negotiate to second floor.  3 steps x 4 (2 trials)       PLAN    [x]  Upgrade activities as tolerated YES Continue plan of care   []  Discharge due to :    []  Other:      Therapist: Keary Favre, DPT    Date: 5/24/2017 Time: 1:26 PM     Future Appointments  Date Time Provider Kavita Real   5/26/2017 10:00 AM Lan Batres PTA [facility]   5/31/2017 1:00 PM Nate Davis PT [facility]

## 2017-05-26 ENCOUNTER — HOSPITAL ENCOUNTER (OUTPATIENT)
Dept: PHYSICAL THERAPY | Age: 82
Discharge: HOME | End: 2017-05-26
Payer: MEDICARE

## 2017-05-26 PROCEDURE — 97530 THERAPEUTIC ACTIVITIES: CPT

## 2017-05-26 PROCEDURE — 97110 THERAPEUTIC EXERCISES: CPT

## 2017-05-26 NOTE — PROGRESS NOTES
PHYSICAL THERAPY - DAILY TREATMENT NOTE    Patient Name: Hossein Huffman        Date: 2017  : 1933   yes Patient  Verified  Visit #:     Insurance: Payor: Prabhumyravania Chamber / Plan: [place] & B / Product Type: Medicare /      In time: 1000 Out time: 530   Total Treatment Time: 35     Medicare Time Tracking (below)   Total Timed Codes (min): 35 1:1 Treatment Time:  35     TREATMENT AREA =  Low back pain [M54.5]    SUBJECTIVE  Pain Level (on 0 to 10 scale): 0 / 10   Medication Changes/New allergies or changes in medical history, any new surgeries or procedures?    no  If yes, update Summary List   Subjective Functional Status/Changes:  []  No changes reported     \"I am so tired today. I do not know why.  I am not going to lie down today sorry; not today\"       OBJECTIVE      12 min Therapeutic Exercise:  [x]  See flow sheet   Rationale:      increase ROM and increase strength to improve the patients ability to   perform ADLs/prolong stding and amb/stairs with ease     23 min Therapeutic Activity: step ups  negotiated up/down 3 steps x 5x1, 4x 1 with B HRs with step to pattern   tap ups  sit<>std transfers   Rationale:    increase ROM and increase strength to improve the patients ability to  perform ADLs/prolong stding and amb/stairs with ease          min Patient Education:  yes  Reviewed HEP   []  Progressed/Changed HEP based on:  Pt ed on importance and benefits of compliance with HEP, core strength/stability and proper posture; pt verbalized understanding     Other Objective/Functional Measures:    VCs + demo to perform proper technique for TE  requested rest after 5 reps and declined TE after 35 mins   PD supine/semi-reclined position after max encouragement  demos decrease posture with stair training; improved with VCs    able to perform RTB rows/ext in standing; previously required YTB for ext and seated rest     Post Treatment Pain Level (on 0 to 10) scale:   0  / 10 ASSESSMENT  Assessment/Changes in Function:     Progressed there-ex without c/o increase p!  limited by pt's fatigue today; pr declined TE after 35 mins  also PD supine/semi relcined      []  See Progress Note/Recertification   Patient will continue to benefit from skilled PT services to modify and progress therapeutic interventions, address functional mobility deficits, address ROM deficits, address strength deficits, analyze and address soft tissue restrictions, analyze and cue movement patterns, analyze and modify body mechanics/ergonomics, assess and modify postural abnormalities and instruct in home and community integration to attain remaining goals. Progress toward goals / Updated goals:    · Long Term Goals: To be accomplished inc2-4 weeks:  · Goals for this certification period include and are to be achieved in  4  weeks:  1. Patient will demonstrate ability to tolerate Hilton's position for 10 minutes without any complications in order to perform mat therex. NOT MET, PD  2. Patient will demonstrate thoracolumbar ext of 50% in order to improve posture with gait and minimize LBP. 3. Patient will perform stair negotiation 5x3 steps without rest break in order to negotiate to second floor.  progressing, negotiated 5 x 2     PLAN  []  Upgrade activities as tolerated yes Continue plan of care   []  Discharge due to :    []  Other:      Therapist: Andreas Pink PTA    Date: 5/26/2017 Time: 11:23 AM     Future Appointments  Date Time Provider Kavita Real   5/26/2017 10:00 AM Andreas Pink PTA [facility]   5/31/2017 1:00 PM Raimundo Cordero PT [facility]   6/12/2017 1:00 PM Andreas Pink PTA [facility]   6/16/2017 1:00 PM [facility]   6/19/2017 1:00 PM [facility]   6/23/2017 1:00 PM [facility]   6/26/2017 1:30 PM Andreas Pink PTA [facility]   6/28/2017 1:00 PM Andreas Pink PTA [facility]

## 2017-05-31 ENCOUNTER — HOSPITAL ENCOUNTER (OUTPATIENT)
Dept: PHYSICAL THERAPY | Age: 82
Discharge: HOME | End: 2017-05-31
Payer: MEDICARE

## 2017-05-31 PROCEDURE — 97110 THERAPEUTIC EXERCISES: CPT

## 2017-05-31 NOTE — PROGRESS NOTES
PHYSICAL THERAPY - DAILY TREATMENT NOTE    Patient Name: Ida Noel        Date: 2017  : 1933   YES Patient  Verified  Visit #:     Insurance: Payor: Karla Camel / Plan: [place] MEDICARE PART A & B / Product Type: Medicare /      In time: 1:00 Out time: 1:30   Total Treatment Time: 30     Medicare Time Tracking (below)   Total Timed Codes (min):  30 1:1 Treatment Time:  30     TREATMENT AREA =  Low back pain [M54.5]    SUBJECTIVE    Pain Level (on 0 to 10 scale):  0  / 10   Medication Changes/New allergies or changes in medical history, any new surgeries or procedures? NO     If yes, update Summary List   Subjective Functional Status/Changes:  []  No changes reported     \"I just don't want to do this. My back isn't getting any better, but I need to talk to my doctor first. I don't go back for 2 weeks. \"          OBJECTIVE    30 min Therapeutic Exercise:  [x]  See flow sheet   Rationale:      increase ROM, increase strength, improve coordination, improve balance and increase proprioception to improve the patients ability to perform ADL's and amb with decreased pain and improved ease      min Patient Education:  YES  Reviewed HEP   []  Progressed/Changed HEP based on:   Patient reports compliance     Other Objective/Functional Measures:    Patient refused bed mobility training today despite education on need to practice  Educated patient on DOMS and process to strengthen muscles  Patient declined several ex's this visit     Post Treatment Pain Level (on 0 to 10) scale:   0  / 10     ASSESSMENT    Assessment/Changes in Function:     Patient is making limited progress towards goals, declined bed mobility and stair training this visit     []  See Progress Note/Recertification   Patient will continue to benefit from skilled PT services to modify and progress therapeutic interventions, address functional mobility deficits, address ROM deficits, address strength deficits, analyze and address soft tissue restrictions, analyze and cue movement patterns, analyze and modify body mechanics/ergonomics and assess and modify postural abnormalities to attain remaining goals. Progress toward goals / Updated goals:    · Goals for this certification period include and are to be achieved in  4  weeks:  1. Patient will demonstrate ability to tolerate Hilton's position for 10 minutes without any complications in order to perform mat therex. Patient refused  2. Patient will demonstrate thoracolumbar ext of 50% in order to improve posture with gait and minimize LBP. 3. Patient will perform stair negotiation 5x3 steps without rest break in order to negotiate to second floor.      PLAN    [x]  Upgrade activities as tolerated YES Continue plan of care   []  Discharge due to :    []  Other:      Therapist: Tai Bowen PT    Date: 5/31/2017 Time: 1:05 PM     Future Appointments  Date Time Provider Kavita Real   6/12/2017 1:00 PM [facility]   6/16/2017 1:00 PM [facility]   6/19/2017 1:00 PM [facility]   6/23/2017 1:00 PM [facility]   6/26/2017 1:30 PM [facility]   6/28/2017 1:00 PM Hari Jimenez [facility]

## 2017-06-12 ENCOUNTER — HOSPITAL ENCOUNTER (OUTPATIENT)
Dept: PHYSICAL THERAPY | Age: 82
Discharge: HOME | End: 2017-06-12
Payer: MEDICARE

## 2017-06-12 PROCEDURE — 97110 THERAPEUTIC EXERCISES: CPT

## 2017-06-12 PROCEDURE — 97530 THERAPEUTIC ACTIVITIES: CPT

## 2017-06-12 NOTE — PROGRESS NOTES
PHYSICAL THERAPY - DAILY TREATMENT NOTE    Patient Name: Paula Klein        Date: 2017  : 1933   yes Patient  Verified  Visit #:   15  of   13-17  Insurance: Payor: Julio Ee / Plan: [place] MEDICARE PART A & B / Product Type: Medicare /      In time: 100 Out time: 145   Total Treatment Time: 45     Medicare Time Tracking (below)   Total Timed Codes (min):  45 1:1 Treatment Time:  45     TREATMENT AREA =  Low back pain [M54.5]    SUBJECTIVE  Pain Level (on 0 to 10 scale): 0 / 10   Medication Changes/New allergies or changes in medical history, any new surgeries or procedures?    no  If yes, update Summary List   Subjective Functional Status/Changes:  []  No changes reported     \"I drove here for the first time. it wasn't bad.  I am really tired today\"       OBJECTIVE      22 min Therapeutic Exercise:  [x]  See flow sheet   Rationale:      increase ROM and increase strength to improve the patients ability to   perform ADLs/prolong stding and amb/stairs with ease     23 min Therapeutic Activity: sup<>sit   sit<>std transfers  (I) HK amb x 30' x 3 with rollator  lying in fowlers position   Rationale:    increase ROM and increase strength to improve the patients ability to  perform ADLs/prolong lying, stding and amb/stairs with ease          min Patient Education:  yes  Reviewed HEP   []  Progressed/Changed HEP based on:  Pt ed on importance and benefits of compliance with HEP, core strength/stability and proper posture; pt verbalized understanding     Other Objective/Functional Measures:    VCs + demo to perform proper technique for TE  initiated  TE in fowlers position for 10 mins without c/o p!; initially c/o p! with sup>sit; subsided after 30 secs of sitting    (I) HK amb x 30' x 3 with rollator to promote increase hip/knee flex during swing phase and to increase HS for proper gait     Post Treatment Pain Level (on 0 to 10) scale:   0  / 10     ASSESSMENT  Assessment/Changes in Function: Progressed there-ex without c/o increase p!  pt able to perform TE in fowlers position for 10 mins without c/o p! []  See Progress Note/Recertification   Patient will continue to benefit from skilled PT services to modify and progress therapeutic interventions, address functional mobility deficits, address ROM deficits, address strength deficits, analyze and address soft tissue restrictions, analyze and cue movement patterns, analyze and modify body mechanics/ergonomics, assess and modify postural abnormalities and instruct in home and community integration to attain remaining goals. Progress toward goals / Updated goals:    · Long Term Goals: To be accomplished inc2-4 weeks:  · Goals for this certification period include and are to be achieved in  4  weeks:  1. Patient will demonstrate ability to tolerate Hilton's position for 10 minutes without any complications in order to perform mat therex. MET  2. Patient will demonstrate thoracolumbar ext of 50% in order to improve posture with gait and minimize LBP. 3. Patient will perform stair negotiation 5x3 steps without rest break in order to negotiate to second floor.  progressing, negotiated 5 x 2     PLAN  []  Upgrade activities as tolerated yes Continue plan of care   []  Discharge due to :    []  Other:      Therapist: Taryn Sommers PTA    Date: 6/12/2017 Time: 3:31 PM     Future Appointments  Date Time Provider Kavita Real   6/16/2017 1:00 PM [facility]   6/19/2017 1:00 PM [facility]   6/23/2017 1:00 PM [facility]   6/26/2017 1:30 PM [facility]   6/28/2017 1:00 PM Taryn Sommers PTA [facility]

## 2017-06-16 ENCOUNTER — HOSPITAL ENCOUNTER (OUTPATIENT)
Dept: PHYSICAL THERAPY | Age: 82
Discharge: HOME | End: 2017-06-16
Payer: MEDICARE

## 2017-06-16 PROCEDURE — 97530 THERAPEUTIC ACTIVITIES: CPT

## 2017-06-16 PROCEDURE — 97110 THERAPEUTIC EXERCISES: CPT

## 2017-06-16 NOTE — PROGRESS NOTES
PHYSICAL THERAPY - DAILY TREATMENT NOTE    Patient Name: Marcell Tracy        Date: 2017  : 1933   yes Patient  Verified  Visit #:   15  of   17  Insurance: Payor: Ziyad Flores / Plan: [place] MEDICARE PART A & B / Product Type: Medicare /      In time: 103 Out time: 145   Total Treatment Time: 42     Medicare Time Tracking (below)   Total Timed Codes (min):  42 1:1 Treatment Time:  35     TREATMENT AREA =  Low back pain [M54.5]    SUBJECTIVE  Pain Level (on 0 to 10 scale): 0 / 10   Medication Changes/New allergies or changes in medical history, any new surgeries or procedures?    no  If yes, update Summary List   Subjective Functional Status/Changes:  []  No changes reported     \"I  was hurting more from lying down\"       OBJECTIVE      19 min Therapeutic Exercise:  [x]  See flow sheet   Rationale:      increase ROM and increase strength to improve the patients ability to   perform ADLs/prolong stding and amb/stairs with ease     23/16 min Therapeutic Activity: sup<>sit   sit<>std transfers  (I) HK amb x 60'  with rollator  lying in fowlers position  tap ups on 7\"   Rationale:    increase ROM and increase strength to improve the patients ability to  perform ADLs/prolong lying, stding and amb/stairs with ease          min Patient Education:  yes  Reviewed HEP   []  Progressed/Changed HEP based on:  Pt ed on importance and benefits of compliance with HEP, core strength/stability and proper posture; pt verbalized understanding     Other Objective/Functional Measures:    VCs + demo to perrm proper technique for TE  Pt c/o tired throughout rx  increased to 7\" tap ups, and negotiated up and down steps x 5 x 2 without c/o increase p! Post Treatment Pain Level (on 0 to 10) scale:   0  / 10     ASSESSMENT  Assessment/Changes in Function:     Progressed there-ex without c/o increase p!        []  See Progress Note/Recertification   Patient will continue to benefit from skilled PT services to modify and progress therapeutic interventions, address functional mobility deficits, address ROM deficits, address strength deficits, analyze and address soft tissue restrictions, analyze and cue movement patterns, analyze and modify body mechanics/ergonomics, assess and modify postural abnormalities and instruct in home and community integration to attain remaining goals. Progress toward goals / Updated goals:  Goals for this certification period include and are to be achieved in  4  weeks:  1. Patient will demonstrate ability to tolerate Hilton's position for 10 minutes without any complications in order to perform mat therex. MET  2. Patient will demonstrate thoracolumbar ext of 50% in order to improve posture with gait and minimize LBP. 3. Patient will perform stair negotiation 5x3 steps without rest break in order to negotiate to second floor.  MET     PLAN  []  Upgrade activities as tolerated yes Continue plan of care   []  Discharge due to :    []  Other:      Therapist: Sarah Fischer PTA    Date: 6/16/2017 Time: 1:13 PM     Future Appointments  Date Time Provider Kavita Real   6/19/2017 1:00 PM [facility]   6/23/2017 1:00 PM [facility]   6/26/2017 1:30 PM [facility]   6/28/2017 1:00 PM Sarah Fischer PTA [facility]

## 2017-06-19 ENCOUNTER — HOSPITAL ENCOUNTER (OUTPATIENT)
Dept: PHYSICAL THERAPY | Age: 82
Discharge: HOME | End: 2017-06-19
Payer: MEDICARE

## 2017-06-19 PROCEDURE — 97530 THERAPEUTIC ACTIVITIES: CPT

## 2017-06-19 PROCEDURE — 97110 THERAPEUTIC EXERCISES: CPT

## 2017-06-19 NOTE — PROGRESS NOTES
PHYSICAL THERAPY - DAILY TREATMENT NOTE    Patient Name: Gilmer Amezcua        Date: 2017  : 1933   yes Patient  Verified  Visit #:     Insurance: Payor: Frances Thorne / Plan: [place] MEDICARE PART A & B / Product Type: Medicare /      In time: 100 Out time: 135   Total Treatment Time: 35     Medicare Time Tracking (below)   Total Timed Codes (min):  35 1:1 Treatment Time:  35     TREATMENT AREA =  Low back pain [M54.5]    SUBJECTIVE  Pain Level (on 0 to 10 scale): 0 / 10   Medication Changes/New allergies or changes in medical history, any new surgeries or procedures?    no  If yes, update Summary List   Subjective Functional Status/Changes:  []  No changes reported     \"I am always tired but I am here. I do not feel that strong in my back yet. I guess I do walk better and the stairs are easier\"        OBJECTIVE      10 min Therapeutic Exercise:  [x]  See flow sheet   Rationale:      increase ROM and increase strength to improve the patients ability to   perform ADLs/prolong stding and amb/stairs with ease     25 min Therapeutic Activity: sit<>std transfers  (I) HK amb x 20'>30'>40'  with rollator  stair training   tap ups on 7\"   Rationale:    increase ROM and increase strength to improve the patients ability to  perform ADLs/prolong lying, stding and amb/stairs with ease          min Patient Education:  yes  Reviewed HEP   []  Progressed/Changed HEP based on:  Pt ed on importance and benefits of compliance with HEP, core strength/stability and proper posture; pt verbalized understanding     Other Objective/Functional Measures:    VCs + demo to perrm proper technique for TE  Pt declined bed TE due to requesting to leave by 135 for another appt  2 finger support for tap ups     Post Treatment Pain Level (on 0 to 10) scale:   0  / 10     ASSESSMENT  Assessment/Changes in Function:     Progressed there-ex without c/o increase p!        []  See Progress Note/Recertification   Patient will continue to benefit from skilled PT services to modify and progress therapeutic interventions, address functional mobility deficits, address ROM deficits, address strength deficits, analyze and address soft tissue restrictions, analyze and cue movement patterns, analyze and modify body mechanics/ergonomics, assess and modify postural abnormalities and instruct in home and community integration to attain remaining goals. Progress toward goals / Updated goals:  Goals for this certification period include and are to be achieved in  4  weeks:  1. Patient will demonstrate ability to tolerate Hilton's position for 10 minutes without any complications in order to perform mat therex. MET  2. Patient will demonstrate thoracolumbar ext of 50% in order to improve posture with gait and minimize LBP. 3. Patient will perform stair negotiation 5x3 steps without rest break in order to negotiate to second floor.  MET     PLAN  []  Upgrade activities as tolerated yes Continue plan of care   []  Discharge due to :    []  Other:      Therapist: Julian Fried PTA    Date: 6/19/2017 Time: 1:39 PM     Future Appointments  Date Time Provider Kavita Real   6/23/2017 1:00 PM [facility]   6/26/2017 1:30 PM [facility]   6/28/2017 1:00 PM Julian Fried PTA [facility]

## 2017-06-23 ENCOUNTER — HOSPITAL ENCOUNTER (OUTPATIENT)
Dept: PHYSICAL THERAPY | Age: 82
Discharge: HOME | End: 2017-06-23
Payer: MEDICARE

## 2017-06-23 PROCEDURE — 97530 THERAPEUTIC ACTIVITIES: CPT

## 2017-06-23 PROCEDURE — 97110 THERAPEUTIC EXERCISES: CPT

## 2017-06-26 ENCOUNTER — HOSPITAL ENCOUNTER (OUTPATIENT)
Dept: PHYSICAL THERAPY | Age: 82
Discharge: HOME | End: 2017-06-26
Payer: MEDICARE

## 2017-06-26 PROCEDURE — 97110 THERAPEUTIC EXERCISES: CPT

## 2017-06-26 PROCEDURE — 97530 THERAPEUTIC ACTIVITIES: CPT

## 2017-06-28 ENCOUNTER — HOSPITAL ENCOUNTER (OUTPATIENT)
Dept: PHYSICAL THERAPY | Age: 82
Discharge: HOME | End: 2017-06-28
Payer: MEDICARE

## 2017-06-28 PROCEDURE — 97110 THERAPEUTIC EXERCISES: CPT

## 2017-06-28 PROCEDURE — 97530 THERAPEUTIC ACTIVITIES: CPT

## 2017-06-28 NOTE — PROGRESS NOTES
PHYSICAL THERAPY - DAILY TREATMENT NOTE    Patient Name: Kalyn Levine        Date: 2017  : 1933   yes Patient  Verified  Visit #:     Insurance: Payor: Patricio Minaya / Plan: [place] MEDICARE PART A & B / Product Type: Medicare /      In time: 100 Out time: 141   Total Treatment Time: 41     Medicare Time Tracking (below)   Total Timed Codes (min):  41 1:1 Treatment Time:  41     TREATMENT AREA =  Low back pain [M54.5]    SUBJECTIVE  Pain Level (on 0 to 10 scale): 0 / 10   Medication Changes/New allergies or changes in medical history, any new surgeries or procedures?    no  If yes, update Summary List   Subjective Functional Status/Changes:  []  No changes reported     \"I been doing my stuff at home. I got a big ball and I am going to walk out on the side walk\"       OBJECTIVE      31 min Therapeutic Exercise:  [x]  See flow sheet   Rationale:      increase ROM and increase strength to improve the patients ability to   perform ADLs/prolong stding and amb/stairs with ease     10 min Therapeutic Activity: sit<>std transfers  (I) HK amb x 50'  with rollator  stair training    Rationale:    increase ROM and increase strength to improve the patients ability to  perform ADLs/prolong lying, stding and amb/stairs with ease          min Patient Education:  yes  Reviewed HEP   []  Progressed/Changed HEP based on:  Pt ed on importance and benefits of compliance with HEP, core strength/stability and proper posture; pt verbalized understanding  See updated HEP in chart       Other Objective/Functional Measures:    VCs + demo to perrm proper technique for TE  sit<>std x 5 without UE x 10 secs  seated T/s ext AROM=50%   Pt is able to demo TE in gym properly without increase p!      Post Treatment Pain Level (on 0 to 10) scale:   0  / 10     ASSESSMENT  Assessment/Changes in Function:     See D/C note     []  See Progress Note/Recertification   Patient will continue to benefit from skilled PT services to See D/C note     Progress toward goals / Updated goals: Mobility   Goal  CJ= 20-39%  D/C  CI= 1-19%. The severity rating is based on the Other sit<>std x 5 without UE  Goals for this certification period include and are to be achieved in  4  weeks:  1. Patient will demonstrate ability to tolerate Hilton's position for 10 minutes without any complications in order to perform mat therex. MET  2. Patient will demonstrate thoracolumbar ext of 50% in order to improve posture with gait and minimize LBP.  MET;   3. Patient will perform stair negotiation 5x3 steps without rest break in order to negotiate to second floor. MET; performs 5 x  3 steps      PLAN  [x]  Upgrade activities as tolerated yes Continue plan of care   []  Discharge due to :    []  Other: Pt has achieved all LTGs. Pt D/C with instructions to cont HEP Independently. Therapist: Alexx Hurtado PTA    Date: 6/28/2017 Time: 1:40 PM     No future appointments.

## 2017-07-05 NOTE — PROGRESS NOTES
[address] [facility] PHYSICAL THERAPY  [address] [facility] [address], [address] - Phone: [phone]  Fax: [phone] SUMMARY  Patient Name: Trupti Washington : 1933   Treatment/Medical Diagnosis: Low back pain [M54.5]   Referral Source: Dafne Moeller NP     Date of Initial Visit: 17 Attended Visits: 18 Missed Visits: 0     SUMMARY OF TREATMENT  Patient's POC has consisted of therex, therapeutic activities, manual therapy prn, modalities prn, pt. education, and a comprehensive HEP. Treatment strategies used to address functional mobility deficits, ROM deficits, strength deficits, analyze and address soft tissue restrictions, analyze and cue movement patterns, analyze and modify body mechanics/ergonomics, assess and modify postural abnormalities and instruct in home and community integration. CURRENT STATUS  Pt has been a pleasure to work with throughout  [facility] and demonstrates compliance with prescribed HEP at this time. She ambulates with significantly improved posture and T/S extension. She reports minimal functional limitations at this time and ambulates safely with rollator. Pt will be D/C at this time with comprehensive HEP.     Goal/Measure of Progress Goal Met?    1. Patient will demonstrate ability to tolerate Hilton's position for 10 minutes without any complications in order to perform mat therex. 2. Patient will demonstrate thoracolumbar ext of 50% in order to improve posture with gait and minimize LBP. 3. Patient will perform stair negotiation 5x3 steps without rest break in order to negotiate to second floor. All goals met        G-Codes: Mobility   Goal  CJ= 20-39%  D/C  CI= 1-19%. The severity rating is based on the Other 5x sit to stand     RECOMMENDATIONS  Discontinue therapy. Progressing towards or have reached established goals. If you have any questions/comments please contact us directly at [phone]. Thank you for allowing us to assist in the care of your patient. Therapist Signature: Cynthia Wellington DPT Date: 7/5/17   Reporting Period: 5/17/17 - 7/5/17 Time: 11:27 AM     ===========================================================================================  I certify that the above Physical Therapy Services are being furnished while the patient is under my care. I agree with the treatment plan and certify that this therapy is necessary. Physician Signature:        Date:       Time:     Please sign and return to [facility] or you may fax the signed copy to [phone]. Thank you.